# Patient Record
Sex: FEMALE | Race: WHITE | NOT HISPANIC OR LATINO | Employment: OTHER | URBAN - NONMETROPOLITAN AREA
[De-identification: names, ages, dates, MRNs, and addresses within clinical notes are randomized per-mention and may not be internally consistent; named-entity substitution may affect disease eponyms.]

---

## 2023-02-21 LAB — RHEUMATOID FACTOR (IU/ML) IN SERUM OR PLASMA: <10 IU/ML (ref 0–15)

## 2023-02-24 LAB — LYME DISEASE (BORRELIA BURGDORFERI), PCR: NOT DETECTED

## 2023-06-23 ENCOUNTER — HOSPITAL ENCOUNTER (OUTPATIENT)
Dept: DATA CONVERSION | Facility: HOSPITAL | Age: 53
End: 2023-06-23
Attending: INTERNAL MEDICINE | Admitting: INTERNAL MEDICINE

## 2023-06-23 DIAGNOSIS — I10 ESSENTIAL (PRIMARY) HYPERTENSION: ICD-10-CM

## 2023-06-23 DIAGNOSIS — Z12.11 ENCOUNTER FOR SCREENING FOR MALIGNANT NEOPLASM OF COLON: ICD-10-CM

## 2023-07-21 DIAGNOSIS — I15.9 SECONDARY HYPERTENSION: Primary | ICD-10-CM

## 2023-07-21 RX ORDER — CALCIUM CARBONATE 600 MG
600 TABLET ORAL DAILY
COMMUNITY

## 2023-07-21 RX ORDER — AMLODIPINE BESYLATE 10 MG/1
10 TABLET ORAL DAILY
Qty: 30 TABLET | Refills: 0 | Status: SHIPPED | OUTPATIENT
Start: 2023-07-21 | End: 2023-08-28 | Stop reason: SDUPTHER

## 2023-07-21 RX ORDER — AMLODIPINE BESYLATE 10 MG/1
10 TABLET ORAL DAILY
COMMUNITY
End: 2023-07-21 | Stop reason: SDUPTHER

## 2023-07-21 RX ORDER — LISINOPRIL 10 MG/1
10 TABLET ORAL DAILY
COMMUNITY
Start: 2023-01-26 | End: 2023-08-31 | Stop reason: ALTCHOICE

## 2023-07-21 RX ORDER — GLUCOSAMINE SULFATE 500 MG
1000 TABLET ORAL DAILY
COMMUNITY

## 2023-08-28 DIAGNOSIS — I15.9 SECONDARY HYPERTENSION: ICD-10-CM

## 2023-08-28 RX ORDER — AMLODIPINE BESYLATE 10 MG/1
10 TABLET ORAL DAILY
Qty: 30 TABLET | Refills: 5 | Status: SHIPPED | OUTPATIENT
Start: 2023-08-28 | End: 2023-08-31 | Stop reason: SDUPTHER

## 2023-08-31 ENCOUNTER — OFFICE VISIT (OUTPATIENT)
Dept: PRIMARY CARE | Facility: CLINIC | Age: 53
End: 2023-08-31
Payer: COMMERCIAL

## 2023-08-31 VITALS
OXYGEN SATURATION: 99 % | DIASTOLIC BLOOD PRESSURE: 80 MMHG | HEART RATE: 71 BPM | WEIGHT: 215 LBS | HEIGHT: 66 IN | BODY MASS INDEX: 34.55 KG/M2 | SYSTOLIC BLOOD PRESSURE: 128 MMHG

## 2023-08-31 DIAGNOSIS — I15.9 SECONDARY HYPERTENSION: ICD-10-CM

## 2023-08-31 PROBLEM — I10 HTN (HYPERTENSION): Status: ACTIVE | Noted: 2023-08-31

## 2023-08-31 PROCEDURE — 99213 OFFICE O/P EST LOW 20 MIN: CPT | Performed by: INTERNAL MEDICINE

## 2023-08-31 PROCEDURE — 3079F DIAST BP 80-89 MM HG: CPT | Performed by: INTERNAL MEDICINE

## 2023-08-31 PROCEDURE — 1036F TOBACCO NON-USER: CPT | Performed by: INTERNAL MEDICINE

## 2023-08-31 PROCEDURE — 3074F SYST BP LT 130 MM HG: CPT | Performed by: INTERNAL MEDICINE

## 2023-08-31 RX ORDER — AMLODIPINE BESYLATE 10 MG/1
10 TABLET ORAL DAILY
Qty: 90 TABLET | Refills: 3 | Status: SHIPPED | OUTPATIENT
Start: 2023-08-31 | End: 2024-02-29 | Stop reason: SDUPTHER

## 2023-08-31 ASSESSMENT — ENCOUNTER SYMPTOMS
DIARRHEA: 0
VOMITING: 0
BACK PAIN: 0
PALPITATIONS: 0
COUGH: 0
ABDOMINAL PAIN: 0
ARTHRALGIAS: 0
SHORTNESS OF BREATH: 0
WHEEZING: 0
BLOOD IN STOOL: 0
FATIGUE: 0
NAUSEA: 0

## 2023-08-31 ASSESSMENT — PATIENT HEALTH QUESTIONNAIRE - PHQ9
SUM OF ALL RESPONSES TO PHQ9 QUESTIONS 1 AND 2: 0
2. FEELING DOWN, DEPRESSED OR HOPELESS: NOT AT ALL
1. LITTLE INTEREST OR PLEASURE IN DOING THINGS: NOT AT ALL

## 2023-08-31 NOTE — PATIENT INSTRUCTIONS
Please remember to get the season's flu vaccine  -We would like to see you back in approximately 6 months and please remember to get fasting lab work done prior to that visit  I sent a refill for your amlodipine with 90-day prescription and 3 refills to drug [facility].  If you have any issues please contact me personally

## 2023-08-31 NOTE — PROGRESS NOTES
Subjective   Patient ID: Zari Coronado is a 52 y.o. female who presents for No chief complaint on file..  HPI  She is here today for her routine checkup.  We did conduct a full review of systems and she is doing quite well.  Her blood pressure is under excellent control on just the amlodipine 10 mg daily.  We are providing a refill today.  We briefly discussed laboratory testing and we will have her get lab work done just prior to her next follow-up visit.  She is up-to-date with cancer screening and I have recommended she get this years flu vaccine.  Review of Systems   Constitutional:  Negative for fatigue.   Respiratory:  Negative for cough, shortness of breath and wheezing.    Cardiovascular:  Negative for chest pain, palpitations and leg swelling.   Gastrointestinal:  Negative for abdominal pain, blood in stool, diarrhea, nausea and vomiting.   Musculoskeletal:  Negative for arthralgias and back pain.     Objective   Physical Exam  Vitals and nursing note reviewed.   Constitutional:       General: She is not in acute distress.     Appearance: Normal appearance.   HENT:      Head: Normocephalic and atraumatic.   Eyes:      Conjunctiva/sclera: Conjunctivae normal.   Cardiovascular:      Rate and Rhythm: Normal rate and regular rhythm.      Heart sounds: Normal heart sounds.   Pulmonary:      Effort: No respiratory distress.      Breath sounds: No wheezing.   Abdominal:      Palpations: Abdomen is soft.      Tenderness: There is no abdominal tenderness. There is no guarding.   Musculoskeletal:         General: No swelling. Normal range of motion.   Skin:     General: Skin is warm and dry.   Neurological:      General: No focal deficit present.      Mental Status: She is alert and oriented to person, place, and time.   Psychiatric:         Behavior: Behavior normal.       Assessment/Plan   Problem List Items Addressed This Visit       HTN (hypertension)     -Currently well controlled         Relevant  Medications    amLODIPine (Norvasc) 10 mg tablet    Other Relevant Orders    Basic Metabolic Panel          Agnieszka Pink,

## 2023-09-07 VITALS — HEIGHT: 66 IN | WEIGHT: 221.34 LBS | BODY MASS INDEX: 35.57 KG/M2

## 2024-02-28 ENCOUNTER — LAB (OUTPATIENT)
Dept: LAB | Facility: LAB | Age: 54
End: 2024-02-28
Payer: COMMERCIAL

## 2024-02-28 DIAGNOSIS — I15.9 SECONDARY HYPERTENSION: ICD-10-CM

## 2024-02-28 LAB
ANION GAP SERPL CALC-SCNC: 10 MMOL/L (ref 10–20)
BUN SERPL-MCNC: 21 MG/DL (ref 6–23)
CALCIUM SERPL-MCNC: 9.8 MG/DL (ref 8.6–10.3)
CHLORIDE SERPL-SCNC: 107 MMOL/L (ref 98–107)
CO2 SERPL-SCNC: 29 MMOL/L (ref 21–32)
CREAT SERPL-MCNC: 0.64 MG/DL (ref 0.5–1.05)
EGFRCR SERPLBLD CKD-EPI 2021: >90 ML/MIN/1.73M*2
GLUCOSE SERPL-MCNC: 89 MG/DL (ref 74–99)
POTASSIUM SERPL-SCNC: 3.6 MMOL/L (ref 3.5–5.3)
SODIUM SERPL-SCNC: 142 MMOL/L (ref 136–145)

## 2024-02-28 PROCEDURE — 36415 COLL VENOUS BLD VENIPUNCTURE: CPT

## 2024-02-28 PROCEDURE — 80048 BASIC METABOLIC PNL TOTAL CA: CPT

## 2024-02-29 ENCOUNTER — OFFICE VISIT (OUTPATIENT)
Dept: PRIMARY CARE | Facility: CLINIC | Age: 54
End: 2024-02-29
Payer: COMMERCIAL

## 2024-02-29 VITALS
DIASTOLIC BLOOD PRESSURE: 84 MMHG | SYSTOLIC BLOOD PRESSURE: 136 MMHG | HEIGHT: 66 IN | WEIGHT: 210 LBS | HEART RATE: 73 BPM | OXYGEN SATURATION: 98 % | BODY MASS INDEX: 33.75 KG/M2

## 2024-02-29 DIAGNOSIS — I15.9 SECONDARY HYPERTENSION: ICD-10-CM

## 2024-02-29 DIAGNOSIS — Z78.0 MENOPAUSE: ICD-10-CM

## 2024-02-29 DIAGNOSIS — Z12.31 ENCOUNTER FOR SCREENING MAMMOGRAM FOR MALIGNANT NEOPLASM OF BREAST: Primary | ICD-10-CM

## 2024-02-29 PROBLEM — B02.30 HERPES ZOSTER OPHTHALMICUS OF LEFT EYE: Status: RESOLVED | Noted: 2021-05-06 | Resolved: 2024-02-29

## 2024-02-29 PROBLEM — M77.30 CALCANEAL SPUR: Status: RESOLVED | Noted: 2024-02-29 | Resolved: 2024-02-29

## 2024-02-29 PROCEDURE — 1036F TOBACCO NON-USER: CPT | Performed by: INTERNAL MEDICINE

## 2024-02-29 PROCEDURE — 3075F SYST BP GE 130 - 139MM HG: CPT | Performed by: INTERNAL MEDICINE

## 2024-02-29 PROCEDURE — 3079F DIAST BP 80-89 MM HG: CPT | Performed by: INTERNAL MEDICINE

## 2024-02-29 PROCEDURE — 99213 OFFICE O/P EST LOW 20 MIN: CPT | Performed by: INTERNAL MEDICINE

## 2024-02-29 RX ORDER — AMLODIPINE BESYLATE 10 MG/1
10 TABLET ORAL DAILY
Qty: 90 TABLET | Refills: 3 | Status: SHIPPED | OUTPATIENT
Start: 2024-02-29

## 2024-02-29 ASSESSMENT — ENCOUNTER SYMPTOMS
VOMITING: 0
BLOOD IN STOOL: 0
COUGH: 0
SHORTNESS OF BREATH: 0
NAUSEA: 0
DIARRHEA: 0
FATIGUE: 0
CHEST TIGHTNESS: 0
BACK PAIN: 0
ARTHRALGIAS: 0
ABDOMINAL PAIN: 0
WHEEZING: 0
PALPITATIONS: 0

## 2024-02-29 ASSESSMENT — PATIENT HEALTH QUESTIONNAIRE - PHQ9
2. FEELING DOWN, DEPRESSED OR HOPELESS: NOT AT ALL
SUM OF ALL RESPONSES TO PHQ9 QUESTIONS 1 AND 2: 0
1. LITTLE INTEREST OR PLEASURE IN DOING THINGS: NOT AT ALL

## 2024-02-29 NOTE — PROGRESS NOTES
Subjective   Patient ID: Zari Coronado is a 53 y.o. female who presents for Follow-up (6 MO FUV).  HPI  She is here today for her routine 6-month checkup.  She is looking well and also reports feeling well.  We did conduct a full review of systems.  She has a slight rash on the left side of her neck which she states she gets periodically but it typically goes away on its own.  She does not like to worry.  We did review her laboratory test results and everything looks really great.  Her blood pressure continues to be under adequate control and we are providing a refill on her amlodipine.  We also see that she is due for her screening mammogram and we will help get that accomplished.  We also discussed the fact that her last bone density was done on October 24, 2019.  It was normal but we have decided to check another bone density just prior to her next follow-up visit.  Review of Systems   Constitutional:  Negative for fatigue.   Respiratory:  Negative for cough, chest tightness, shortness of breath and wheezing.    Cardiovascular:  Negative for chest pain, palpitations and leg swelling.   Gastrointestinal:  Negative for abdominal pain, blood in stool, diarrhea, nausea and vomiting.   Musculoskeletal:  Negative for arthralgias and back pain.     Objective   Physical Exam  Vitals and nursing note reviewed.   Constitutional:       General: She is not in acute distress.     Appearance: Normal appearance.   HENT:      Head: Normocephalic and atraumatic.   Eyes:      Conjunctiva/sclera: Conjunctivae normal.   Cardiovascular:      Rate and Rhythm: Normal rate and regular rhythm.      Heart sounds: Normal heart sounds.   Pulmonary:      Effort: No respiratory distress.      Breath sounds: No wheezing.   Abdominal:      Palpations: Abdomen is soft.      Tenderness: There is no abdominal tenderness. There is no guarding.   Musculoskeletal:         General: No swelling. Normal range of motion.   Skin:     General: Skin is  warm and dry.   Neurological:      General: No focal deficit present.      Mental Status: She is alert and oriented to person, place, and time.   Psychiatric:         Behavior: Behavior normal.       Recent Results (from the past 672 hour(s))   Basic Metabolic Panel    Collection Time: 02/28/24  7:35 AM   Result Value Ref Range    Glucose 89 74 - 99 mg/dL    Sodium 142 136 - 145 mmol/L    Potassium 3.6 3.5 - 5.3 mmol/L    Chloride 107 98 - 107 mmol/L    Bicarbonate 29 21 - 32 mmol/L    Anion Gap 10 10 - 20 mmol/L    Urea Nitrogen 21 6 - 23 mg/dL    Creatinine 0.64 0.50 - 1.05 mg/dL    eGFR >90 >60 mL/min/1.73m*2    Calcium 9.8 8.6 - 10.3 mg/dL       Assessment/Plan   Problem List Items Addressed This Visit             ICD-10-CM    HTN (hypertension) I10     -Currently adequately controlled and she does monitor her blood pressures at home         Relevant Medications    amLODIPine (Norvasc) 10 mg tablet    Other Relevant Orders    Basic Metabolic Panel    Encounter for screening mammogram for malignant neoplasm of breast - Primary Z12.31    Relevant Orders    BI mammo bilateral screening tomosynthesis    Menopause Z78.0    Relevant Orders    XR DEXA bone density          Agnieszka Pink DO

## 2024-02-29 NOTE — PATIENT INSTRUCTIONS
As we discussed the staff will be helping you to schedule your annual screening mammogram which will be due on or after March 30  Your last bone density was done on October 24, 2019 so just prior to your next follow-up visit staff will be scheduling your bone density to be done and if everything looks good we could possibly go to annual checkup  I sent a refill to your pharmacy for amlodipine  I did order a BMP to be done just prior to your next follow-up visit

## 2024-04-04 ENCOUNTER — APPOINTMENT (OUTPATIENT)
Dept: RADIOLOGY | Facility: CLINIC | Age: 54
End: 2024-04-04
Payer: COMMERCIAL

## 2024-04-04 ENCOUNTER — HOSPITAL ENCOUNTER (OUTPATIENT)
Dept: RADIOLOGY | Facility: CLINIC | Age: 54
Discharge: HOME | End: 2024-04-04
Payer: COMMERCIAL

## 2024-04-04 VITALS — HEIGHT: 66 IN | WEIGHT: 209.99 LBS | BODY MASS INDEX: 33.75 KG/M2

## 2024-04-04 DIAGNOSIS — Z12.31 ENCOUNTER FOR SCREENING MAMMOGRAM FOR MALIGNANT NEOPLASM OF BREAST: ICD-10-CM

## 2024-04-04 PROCEDURE — 77067 SCR MAMMO BI INCL CAD: CPT

## 2024-04-04 PROCEDURE — 77067 SCR MAMMO BI INCL CAD: CPT | Performed by: RADIOLOGY

## 2024-04-04 PROCEDURE — 77063 BREAST TOMOSYNTHESIS BI: CPT | Performed by: RADIOLOGY

## 2024-08-22 ENCOUNTER — HOSPITAL ENCOUNTER (OUTPATIENT)
Dept: RADIOLOGY | Facility: CLINIC | Age: 54
Discharge: HOME | End: 2024-08-22
Payer: COMMERCIAL

## 2024-08-22 DIAGNOSIS — Z78.0 MENOPAUSE: ICD-10-CM

## 2024-08-22 PROCEDURE — 77080 DXA BONE DENSITY AXIAL: CPT | Performed by: RADIOLOGY

## 2024-08-22 PROCEDURE — 77080 DXA BONE DENSITY AXIAL: CPT

## 2024-08-22 ASSESSMENT — LIFESTYLE VARIABLES
CURRENT_SMOKER: N
3_OR_MORE_DRINKS_PER_DAY: N

## 2024-08-29 ENCOUNTER — APPOINTMENT (OUTPATIENT)
Dept: PRIMARY CARE | Facility: CLINIC | Age: 54
End: 2024-08-29
Payer: COMMERCIAL

## 2024-08-29 VITALS
OXYGEN SATURATION: 98 % | SYSTOLIC BLOOD PRESSURE: 132 MMHG | WEIGHT: 217 LBS | BODY MASS INDEX: 34.87 KG/M2 | HEART RATE: 75 BPM | HEIGHT: 66 IN | DIASTOLIC BLOOD PRESSURE: 80 MMHG

## 2024-08-29 DIAGNOSIS — I15.9 SECONDARY HYPERTENSION: ICD-10-CM

## 2024-08-29 DIAGNOSIS — Z78.0 OSTEOPENIA AFTER MENOPAUSE: Primary | ICD-10-CM

## 2024-08-29 DIAGNOSIS — Z13.220 LIPID SCREENING: ICD-10-CM

## 2024-08-29 DIAGNOSIS — M85.80 OSTEOPENIA AFTER MENOPAUSE: Primary | ICD-10-CM

## 2024-08-29 PROCEDURE — 3075F SYST BP GE 130 - 139MM HG: CPT | Performed by: INTERNAL MEDICINE

## 2024-08-29 PROCEDURE — 1036F TOBACCO NON-USER: CPT | Performed by: INTERNAL MEDICINE

## 2024-08-29 PROCEDURE — 3079F DIAST BP 80-89 MM HG: CPT | Performed by: INTERNAL MEDICINE

## 2024-08-29 PROCEDURE — 3008F BODY MASS INDEX DOCD: CPT | Performed by: INTERNAL MEDICINE

## 2024-08-29 PROCEDURE — 99213 OFFICE O/P EST LOW 20 MIN: CPT | Performed by: INTERNAL MEDICINE

## 2024-08-29 RX ORDER — AMLODIPINE BESYLATE 10 MG/1
10 TABLET ORAL DAILY
Qty: 90 TABLET | Refills: 3 | Status: SHIPPED | OUTPATIENT
Start: 2024-08-29

## 2024-08-29 ASSESSMENT — ENCOUNTER SYMPTOMS
NAUSEA: 0
FATIGUE: 0
WHEEZING: 0
ABDOMINAL PAIN: 0
ARTHRALGIAS: 0
BACK PAIN: 0
SHORTNESS OF BREATH: 0
BLOOD IN STOOL: 0
COUGH: 0
PALPITATIONS: 0
DIARRHEA: 0
CHEST TIGHTNESS: 0
VOMITING: 0

## 2024-08-29 ASSESSMENT — PATIENT HEALTH QUESTIONNAIRE - PHQ9
2. FEELING DOWN, DEPRESSED OR HOPELESS: NOT AT ALL
1. LITTLE INTEREST OR PLEASURE IN DOING THINGS: NOT AT ALL
SUM OF ALL RESPONSES TO PHQ9 QUESTIONS 1 AND 2: 0

## 2024-08-29 NOTE — PROGRESS NOTES
Subjective   Patient ID: Zari Coronado is a 53 y.o. female who presents for Follow-up (6 MO FUV).  HPI  She is here today for her routine 6-month checkup.  She is looking well and also reports feeling well.  We did conduct a full review of systems.  We also went over the results of her recent bone density.  We are reminded that she had a previous exam back on October 24, 2019.  We discussed her scoring and her worst T-score was involving the left femoral neck at -1.3.  We talked about bone health and the importance of routine exercise.  We also briefly discussed calcium and vitamin D supplementation.  I am sending her several handouts via [employer] on bone health.  We are providing a refill on her amlodipine.  We decided that we could safely see her back on an annual basis and sooner if any problems.  Review of Systems   Constitutional:  Negative for fatigue.   Respiratory:  Negative for cough, chest tightness, shortness of breath and wheezing.    Cardiovascular:  Negative for chest pain, palpitations and leg swelling.   Gastrointestinal:  Negative for abdominal pain, blood in stool, diarrhea, nausea and vomiting.   Musculoskeletal:  Negative for arthralgias and back pain.     Objective   Physical Exam  Vitals and nursing note reviewed.   Constitutional:       General: She is not in acute distress.     Appearance: Normal appearance.   HENT:      Head: Normocephalic and atraumatic.   Eyes:      Conjunctiva/sclera: Conjunctivae normal.   Cardiovascular:      Rate and Rhythm: Normal rate and regular rhythm.      Heart sounds: Normal heart sounds.   Pulmonary:      Effort: No respiratory distress.      Breath sounds: No wheezing.   Abdominal:      Palpations: Abdomen is soft.      Tenderness: There is no abdominal tenderness. There is no guarding.   Musculoskeletal:         General: No swelling. Normal range of motion.   Skin:     General: Skin is warm and dry.   Neurological:      General: No focal deficit present.       Mental Status: She is alert and oriented to person, place, and time.   Psychiatric:         Behavior: Behavior normal.       Assessment/Plan   Problem List Items Addressed This Visit             ICD-10-CM    HTN (hypertension) I10    Relevant Medications    amLODIPine (Norvasc) 10 mg tablet    Other Relevant Orders    Basic Metabolic Panel    Lipid screening Z13.220    Relevant Orders    Lipid Panel    Osteopenia after menopause - Primary M85.80, Z78.0     -Do weight bearing exercises  -Will focus on calcium and vitamin D supplementation  -Handout sent via [employer]  -Check bone density in 2 years        PT INSTRUCTIONS  As we discussed I sent 2 handouts to you via [employer] on calcium, vitamin D supplementation, and bone health  We will want to check your bone density again in approximately 2 years  We decided to have you return in 1 year and please try to remember to get lab work done prior to that visit  As we discussed I would recommend he received the season's flu vaccine and I also highly recommend the Shingrix vaccine which can be obtained at your local pharmacy       Agnieszka iPnk,

## 2024-08-29 NOTE — ASSESSMENT & PLAN NOTE
-Do weight bearing exercises  -Will focus on calcium and vitamin D supplementation  -Handout sent via [employer]  -Check bone density in 2 years

## 2024-08-29 NOTE — PATIENT INSTRUCTIONS
As we discussed I sent 2 handouts to you via [employer] on calcium, vitamin D supplementation, and bone health  We will want to check your bone density again in approximately 2 years  We decided to have you return in 1 year and please try to remember to get lab work done prior to that visit  As we discussed I would recommend he received the season's flu vaccine and I also highly recommend the Shingrix vaccine which can be obtained at your local pharmacy

## 2025-02-13 ENCOUNTER — OFFICE VISIT (OUTPATIENT)
Age: 55
End: 2025-02-13
Payer: COMMERCIAL

## 2025-02-13 VITALS
HEART RATE: 87 BPM | OXYGEN SATURATION: 95 % | WEIGHT: 221.2 LBS | HEIGHT: 66 IN | SYSTOLIC BLOOD PRESSURE: 136 MMHG | BODY MASS INDEX: 35.55 KG/M2 | DIASTOLIC BLOOD PRESSURE: 84 MMHG

## 2025-02-13 DIAGNOSIS — J01.00 ACUTE NON-RECURRENT MAXILLARY SINUSITIS: Primary | ICD-10-CM

## 2025-02-13 PROCEDURE — 99213 OFFICE O/P EST LOW 20 MIN: CPT | Performed by: INTERNAL MEDICINE

## 2025-02-13 PROCEDURE — 3075F SYST BP GE 130 - 139MM HG: CPT | Performed by: INTERNAL MEDICINE

## 2025-02-13 PROCEDURE — 1036F TOBACCO NON-USER: CPT | Performed by: INTERNAL MEDICINE

## 2025-02-13 PROCEDURE — 3008F BODY MASS INDEX DOCD: CPT | Performed by: INTERNAL MEDICINE

## 2025-02-13 PROCEDURE — 3079F DIAST BP 80-89 MM HG: CPT | Performed by: INTERNAL MEDICINE

## 2025-02-13 RX ORDER — AMOXICILLIN AND CLAVULANATE POTASSIUM 875; 125 MG/1; MG/1
875 TABLET, FILM COATED ORAL 2 TIMES DAILY
Qty: 20 TABLET | Refills: 0 | Status: SHIPPED | OUTPATIENT
Start: 2025-02-13 | End: 2025-02-23

## 2025-02-13 ASSESSMENT — ENCOUNTER SYMPTOMS
FEVER: 0
SHORTNESS OF BREATH: 0
NAUSEA: 0
PALPITATIONS: 0
SINUS PAIN: 1
COUGH: 1
SINUS PRESSURE: 1
CHEST TIGHTNESS: 0
FATIGUE: 0
DIARRHEA: 0
ARTHRALGIAS: 0
WHEEZING: 0
UNEXPECTED WEIGHT CHANGE: 0
VOMITING: 0

## 2025-02-13 ASSESSMENT — PATIENT HEALTH QUESTIONNAIRE - PHQ9
1. LITTLE INTEREST OR PLEASURE IN DOING THINGS: NOT AT ALL
2. FEELING DOWN, DEPRESSED OR HOPELESS: NOT AT ALL
SUM OF ALL RESPONSES TO PHQ9 QUESTIONS 1 AND 2: 0

## 2025-02-13 NOTE — ASSESSMENT & PLAN NOTE
-Symptoms present for approximately 2 weeks  -Clinical features of sinusitis  -We will prescribe Augmentin twice daily for the next 10 days-  -discussed taking it with food  -Discussed taking Mucinex  -Discussed using Flonase  -She will call if her condition is worsening as opposed to getting better

## 2025-02-13 NOTE — PATIENT INSTRUCTIONS
Pt. Instructions  As we discussed I sent a prescription to your pharmacy for the drug called Augmentin and please take this twice a day with food for the next 10 days.  Please also take over-the-counter Mucinex also known as guaifenesin because this will help thin the mucus and clear from your sinuses more effectively.  You can also try over-the-counter Flonase nasal spray.  Please call if you feel like your condition is worsening as opposed to getting better

## 2025-02-13 NOTE — PROGRESS NOTES
Subjective   Patient ID: Zari Coronado is a 54 y.o. female who presents for URI (COUGH, COLD, CONGESTION X2 WEEKS).  URI   Associated symptoms include coughing, ear pain and sinus pain. Pertinent negatives include no chest pain, diarrhea, nausea, vomiting or wheezing.   She is here today with a 2-week history of upper airway congestion and more recently she has developed some pressure in the sinus region of the right side of her face.  She has had symptoms of postnasal drip and cough and she is expectorating discolored thick mucus.  Based on today's evaluation I have diagnosed with sinusitis and we will treat her with antibiotic therapy.  We also discussed using over-the-counter Mucinex concomitantly with the antibiotic and perhaps trying Flonase if she does start seeing improvement in the next 48 hours.  Review of Systems   Constitutional:  Negative for fatigue, fever and unexpected weight change.   HENT:  Positive for ear pain, postnasal drip, sinus pressure and sinus pain.    Respiratory:  Positive for cough. Negative for chest tightness, shortness of breath and wheezing.    Cardiovascular:  Negative for chest pain, palpitations and leg swelling.   Gastrointestinal:  Negative for diarrhea, nausea and vomiting.   Musculoskeletal:  Negative for arthralgias.     Objective   Physical Exam  Vitals and nursing note reviewed.   Constitutional:       General: She is not in acute distress.     Appearance: Normal appearance.   HENT:      Head: Normocephalic and atraumatic.   Eyes:      Conjunctiva/sclera: Conjunctivae normal.   Cardiovascular:      Rate and Rhythm: Normal rate and regular rhythm.      Heart sounds: Normal heart sounds.   Pulmonary:      Effort: No respiratory distress.      Breath sounds: No wheezing.   Abdominal:      Palpations: Abdomen is soft.      Tenderness: There is no abdominal tenderness. There is no guarding.   Musculoskeletal:         General: No swelling. Normal range of motion.   Skin:      General: Skin is warm and dry.   Neurological:      General: No focal deficit present.      Mental Status: She is alert and oriented to person, place, and time.   Psychiatric:         Behavior: Behavior normal.       Assessment/Plan   Problem List Items Addressed This Visit             ICD-10-CM    Acute non-recurrent maxillary sinusitis - Primary J01.00     -Symptoms present for approximately 2 weeks  -Clinical features of sinusitis  -We will prescribe Augmentin twice daily for the next 10 days-  -discussed taking it with food  -Discussed taking Mucinex  -Discussed using Flonase  -She will call if her condition is worsening as opposed to getting better         Relevant Medications    amoxicillin-pot clavulanate (Augmentin) 875-125 mg tablet   Pt. Instructions  As we discussed I sent a prescription to your pharmacy for the drug called Augmentin and please take this twice a day with food for the next 10 days.  Please also take over-the-counter Mucinex also known as guaifenesin because this will help thin the mucus and clear from your sinuses more effectively.  You can also try over-the-counter Flonase nasal spray.  Please call if you feel like your condition is worsening as opposed to getting better       Agnieszka Pink, DO

## 2025-05-12 ENCOUNTER — TELEPHONE (OUTPATIENT)
Age: 55
End: 2025-05-12
Payer: COMMERCIAL

## 2025-05-12 DIAGNOSIS — B02.9 HERPES ZOSTER WITHOUT COMPLICATION: Primary | ICD-10-CM

## 2025-05-12 RX ORDER — VALACYCLOVIR HYDROCHLORIDE 1 G/1
1000 TABLET, FILM COATED ORAL 3 TIMES DAILY
Qty: 21 TABLET | Refills: 0 | Status: SHIPPED | OUTPATIENT
Start: 2025-05-12 | End: 2025-05-19

## 2025-05-12 NOTE — TELEPHONE ENCOUNTER
For her particular situation I did agree to send in the Valtrex 1 g 3 times a day for 7 days to her pharmacy.  Please tell her I would like to see her if she is not getting better. Thank you

## 2025-05-12 NOTE — TELEPHONE ENCOUNTER
BERTIN STATING SHE IS HAVING A SHINGLES FLARE UP ON HER FACE AND IS ASKING THAT YOU SEND HER SOMETHING IN

## 2025-07-31 ENCOUNTER — HOSPITAL ENCOUNTER (OUTPATIENT)
Dept: RADIOLOGY | Facility: CLINIC | Age: 55
Discharge: HOME | End: 2025-07-31
Payer: COMMERCIAL

## 2025-07-31 ENCOUNTER — APPOINTMENT (OUTPATIENT)
Dept: ORTHOPEDIC SURGERY | Facility: CLINIC | Age: 55
End: 2025-07-31
Payer: COMMERCIAL

## 2025-07-31 ENCOUNTER — HOSPITAL ENCOUNTER (OUTPATIENT)
Dept: RADIOLOGY | Facility: EXTERNAL LOCATION | Age: 55
Discharge: HOME | End: 2025-07-31

## 2025-07-31 VITALS — WEIGHT: 218.2 LBS | BODY MASS INDEX: 35.24 KG/M2

## 2025-07-31 DIAGNOSIS — M25.561 CHRONIC PAIN OF BOTH KNEES: ICD-10-CM

## 2025-07-31 DIAGNOSIS — G89.29 CHRONIC PAIN OF BOTH KNEES: ICD-10-CM

## 2025-07-31 DIAGNOSIS — M25.562 CHRONIC PAIN OF BOTH KNEES: ICD-10-CM

## 2025-07-31 DIAGNOSIS — M25.569 KNEE PAIN, UNSPECIFIED CHRONICITY, UNSPECIFIED LATERALITY: ICD-10-CM

## 2025-07-31 PROCEDURE — 1036F TOBACCO NON-USER: CPT | Performed by: SPECIALIST

## 2025-07-31 PROCEDURE — 99204 OFFICE O/P NEW MOD 45 MIN: CPT | Performed by: SPECIALIST

## 2025-07-31 PROCEDURE — 73564 X-RAY EXAM KNEE 4 OR MORE: CPT | Mod: 50

## 2025-07-31 RX ORDER — DICLOFENAC SODIUM 10 MG/G
4 GEL TOPICAL 4 TIMES DAILY PRN
Qty: 100 G | Refills: 1 | Status: SHIPPED | OUTPATIENT
Start: 2025-07-31

## 2025-07-31 RX ORDER — IBUPROFEN 600 MG/1
600 TABLET, FILM COATED ORAL EVERY 8 HOURS PRN
Qty: 30 TABLET | Refills: 1 | Status: SHIPPED | OUTPATIENT
Start: 2025-07-31

## 2025-07-31 ASSESSMENT — PAIN - FUNCTIONAL ASSESSMENT: PAIN_FUNCTIONAL_ASSESSMENT: 0-10

## 2025-07-31 ASSESSMENT — PAIN SCALES - GENERAL
PAINLEVEL_OUTOF10: 8
PAINLEVEL_OUTOF10: 8

## 2025-07-31 ASSESSMENT — PAIN DESCRIPTION - DESCRIPTORS
DESCRIPTORS: SHARP
DESCRIPTORS: SHARP

## 2025-07-31 NOTE — PROGRESS NOTES
Assessment/Plan   Encounter Diagnoses:  Knee pain, unspecified chronicity, unspecified laterality    Chronic pain of both knees  Knee pain  Assessment bilateral osteoarthritis of the knees.    Plan:  Voltaren gel use as directed.  Motrin 600 mg p.o. twice daily as needed pain  I reviewed the ultrasounds with her in real-time.  She may benefit from intra-articular cortisone injections.  I offered these today but she deferred.  Follow-up in 6 weeks for reevaluation.       Subjective    Patient ID: Zari Coronado is a 54 y.o. female.    Chief Complaint: New Patient Visit and Pain of the Left Knee and New Patient Visit and Pain of the Right Knee     Last Surgery: No surgery found  Last Surgery Date: No surgery found    HPI  54-year-old  who is complaining bilateral knee pain.  She states that standing and squatting and kneeling during work aggravates her knees.  They have been worsening over time.  Sitting relieves her pain.  Although getting up from a seated position does aggravate her knees.  She takes chondroitin sulfate and glucosamine.  She has done this for many months with no firm sense that it is beneficial.    OBJECTIVE: ORTHO EXAM    Right knee:  Skin healthy and intact  No gross swelling or ecchymosis  Alignment: Varus  Effusion: Mild  ROM: 0 degrees Extension   120 degrees Flexion  Minimal crepitance with range of motion  No pain with internal rotation of the hip  Tenderness to palpation: Medial joint line     10 degrees laxity to valgus stress  No laxity to varus stress  Negative Lachman´s test  Negative posterior drawer test  Mild pain with Sherly´s test     Neurovascular exam normal distally  2+ DP pulse and good cap refill      Left knee:  Skin healthy and intact  No gross swelling or ecchymosis  Alignment: Varus  Effusion: Minimal  ROM: 0 degrees Extension   110 degrees Flexion  Minimal crepitance with range of motion  No pain with internal rotation of the hip  Tenderness to palpation:  Mild medial joint line     10 degrees laxity to valgus stress  No laxity to varus stress  Negative Lachman´s test  Negative posterior drawer test  Minimal pain with Sherly´s test     Neurovascular exam normal distally  2+ DP pulse and good cap refill    IMAGE RESULTS:  Point of Care Ultrasound  These images are not reportable by radiology and will not be interpreted   by  Radiologists.      ULTRASOUND  DIAGNOSTIC ULTRASOUND REPORT FINAL: Left KNEE  Sonographer: Ellis Pires MD  Indication: Knee Pain  Procedure: Ultrasound, extremity, nonvascular, real-time, COMPLETE, anatomic specific  Technique: B-Mode Ultrasound Examination performed using 6- 9 MHz linear transducer with [employer]  STUDY TYPE:   1. ULTRASOUND EXTREMITY  2. REAL TIME WITH IMAGE DOCUMENTATION  3. NON-VASCULAR  4. COMPLETE STUDY, INCLUDING BUT NOT LIMITED TO MUSCLE, TENDONS, LIGAMENTS, SOFT TISSUES, ADIPOSE TISSUE AND SUBCUTANEOUS TISSUE.  Site: KNEE   Live ultrasound was performed with of patient's  KNEE and PERMANENTLY documented. I personally performed the ultrasound and reviewed the findings. These show:    Miriam-articular evaluation:   An intact Quadriceps Tendon with the Quadriceps Muscle fibers showing normal striations Quadriceps Tendon demonstrating normal fibrillar pattern. . The Patellar Tendon demonstrates normal fibrillar pattern and is intact.   No significant soft tissue fluid collection/abscess appreciated.     Joint Evaluation:  The lateral joint line shows an intact LCL.   Medial joint line exam shows an intact MCL.   The patellar tendon was within normal limits.  Minimal joint effusion noted.     The patient tolerated the procedure well.    DIAGNOSTIC ULTRASOUND REPORT FINAL: Right KNEE  Sonographer: Ellis Pires MD  Indication: Knee Pain  Procedure: Ultrasound, extremity, nonvascular, real-time, COMPLETE, anatomic specific  Technique: B-Mode Ultrasound Examination performed using 6- 9 MHz linear transducer with [employer]  Software  STUDY TYPE:   1. ULTRASOUND EXTREMITY  2. REAL TIME WITH IMAGE DOCUMENTATION  3. NON-VASCULAR  4. COMPLETE STUDY, INCLUDING BUT NOT LIMITED TO MUSCLE, TENDONS, LIGAMENTS, SOFT TISSUES, ADIPOSE TISSUE AND SUBCUTANEOUS TISSUE.  Site: KNEE   Live ultrasound was performed with of patient's  KNEE and PERMANENTLY documented. I personally performed the ultrasound and reviewed the findings. These show:    Miriam-articular evaluation:   An intact Quadriceps Tendon with the Quadriceps Muscle fibers showing normal striations Quadriceps Tendon demonstrating normal fibrillar pattern. . The Patellar Tendon demonstrates normal fibrillar pattern and is intact.   No significant soft tissue fluid collection/abscess appreciated.     Joint Evaluation:  The lateral joint line shows an intact LCL.   Medial joint line exam shows an intact MCL.   The patellar tendon was within normal limits.  Minimal joint effusion noted.     The patient tolerated the procedure well.        Procedures     Orders Placed This Encounter    Point of Care Ultrasound    XR knee 4+ views bilateral       The contents of this note were dictated into the dragon software.  Sometimes errors in wording, punctuation, or spelling are seen.

## 2025-07-31 NOTE — ASSESSMENT & PLAN NOTE
Assessment bilateral osteoarthritis of the knees.    Plan:  Voltaren gel use as directed.  Motrin 600 mg p.o. twice daily as needed pain  I reviewed the ultrasounds with her in real-time.  She may benefit from intra-articular cortisone injections.  I offered these today but she deferred.  Follow-up in 6 weeks for reevaluation.

## 2025-09-04 ENCOUNTER — APPOINTMENT (OUTPATIENT)
Age: 55
End: 2025-09-04
Payer: COMMERCIAL

## 2025-09-04 PROBLEM — Z13.1 ENCOUNTER FOR SCREENING EXAMINATION FOR IMPAIRED GLUCOSE REGULATION AND DIABETES MELLITUS: Status: ACTIVE | Noted: 2024-02-29

## 2025-09-04 PROBLEM — E66.9 OBESITY (BMI 30-39.9): Status: ACTIVE | Noted: 2025-09-04

## 2025-09-04 PROBLEM — J01.00 ACUTE NON-RECURRENT MAXILLARY SINUSITIS: Status: RESOLVED | Noted: 2025-02-13 | Resolved: 2025-09-04

## 2025-09-04 ASSESSMENT — ENCOUNTER SYMPTOMS
FATIGUE: 0
BLOOD IN STOOL: 0
DIARRHEA: 0
SHORTNESS OF BREATH: 0
VOMITING: 0
NAUSEA: 0
PALPITATIONS: 0
ARTHRALGIAS: 0
BACK PAIN: 0
WHEEZING: 0
COUGH: 0
ABDOMINAL PAIN: 0

## 2025-09-04 ASSESSMENT — PATIENT HEALTH QUESTIONNAIRE - PHQ9
SUM OF ALL RESPONSES TO PHQ9 QUESTIONS 1 AND 2: 0
1. LITTLE INTEREST OR PLEASURE IN DOING THINGS: NOT AT ALL
2. FEELING DOWN, DEPRESSED OR HOPELESS: NOT AT ALL

## 2025-10-02 ENCOUNTER — APPOINTMENT (OUTPATIENT)
Age: 55
End: 2025-10-02
Payer: COMMERCIAL

## 2025-10-02 ENCOUNTER — APPOINTMENT (OUTPATIENT)
Dept: RADIOLOGY | Facility: CLINIC | Age: 55
End: 2025-10-02
Payer: COMMERCIAL